# Patient Record
Sex: MALE | Race: WHITE
[De-identification: names, ages, dates, MRNs, and addresses within clinical notes are randomized per-mention and may not be internally consistent; named-entity substitution may affect disease eponyms.]

---

## 2018-02-04 ENCOUNTER — HOSPITAL ENCOUNTER (EMERGENCY)
Dept: HOSPITAL 80 - FED | Age: 48
Discharge: HOME | End: 2018-02-04
Payer: COMMERCIAL

## 2018-02-04 VITALS
DIASTOLIC BLOOD PRESSURE: 84 MMHG | OXYGEN SATURATION: 93 % | SYSTOLIC BLOOD PRESSURE: 125 MMHG | HEART RATE: 77 BPM | RESPIRATION RATE: 14 BRPM

## 2018-02-04 VITALS — TEMPERATURE: 97.7 F
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
## 2018-02-04 DIAGNOSIS — Z87.891: ICD-10-CM

## 2018-02-04 DIAGNOSIS — S43.004A: Primary | ICD-10-CM

## 2018-02-04 PROCEDURE — A4565 SLINGS: HCPCS

## 2018-02-04 PROCEDURE — 0RSJXZZ REPOSITION RIGHT SHOULDER JOINT, EXTERNAL APPROACH: ICD-10-PCS

## 2018-02-04 NOTE — EDPHY
H & P


Stated Complaint: FALL/PROBABLE SHOULDER DISLOCATION





- Personal History


Current Tetanus/Diphtheria Vaccine: Yes





- Medical/Surgical History


Hx Asthma: No


Hx Chronic Respiratory Disease: No


Hx Diabetes: No


Hx Cardiac Disease: No


Hx Renal Disease: No


Hx Cirrhosis: No


Hx Alcoholism: No


Hx HIV/AIDS: No


Hx Splenectomy or Spleen Trauma: No


Other PMH: DENIES





- Social History


Smoking Status: Former smoker


HPI/ROS: 





Chief complaint:  Right shoulder injury





History of present illness:  This is a 47-year-old male who presents to the 

emergency department for a right shoulder injury. Patient reports just prior to 

arrival patient slipped on the ice landing onto his right shoulder.  Since then 

he has had pain in the shoulder and cannot move it.  Reports he has some 

numbness over the right deltoid region. He denies associated signs or symptoms 

including no open wounds.  No report of trauma to the head, neck, chest or 

other parts of the body.





Review of systems:  A 10 point review of systems was obtained and other than 

described above was negative (Dimitri Vasquez)





- Physical Exam


Exam: 





General Appearance: Alert. Nontoxic.


Eyes:  PERRLA


ENT:  No hemotympanum, no benz sign, no raccoon eyes


Respiratory:  Lungs clear to auscultation bilaterally 


Cardiac: Regular rate and rhythm.


Gastrointestinal:  Bowel sounds normal.  Abdomen soft, nondistended, nontender.


Neurological:  Alert and oriented x4. Strength is intact and symmetrical.  

Patient does report decreased sensation over the right deltoid region.  The 

rest the extremities with normal sensation.


Skin:  No open wounds on inspection of the right upper extremity.


Musculoskeletal:  The head is nontender without crepitus or bony deformity.  

The spine is nontender to palpation along its entire length without crepitus, 

bony deformity or step-off.  Chest wall intact to palpation.  Tenderness to the 

right shoulder with obvious defect of the glenohumeral joint.  He cannot move 

the right shoulder because of pain.  Rest of the right upper extremity and 

other extremities are unremarkable. 


 (Dimitri Vasquez)


Constitutional: 





 Initial Vital Signs











Temperature (C)  36.5 C   02/04/18 10:38


 


Heart Rate  107 H  02/04/18 10:38


 


Respiratory Rate  18   02/04/18 10:38


 


Blood Pressure  142/109 H  02/04/18 10:38


 


O2 Sat (%)  100   02/04/18 10:38








 











O2 Delivery Mode               Room Air














Allergies/Adverse Reactions: 


 





Penicillins Allergy (Verified 02/04/18 10:37)


 








Home Medications: 














 Medication  Instructions  Recorded


 


Hydrocodone/APAP 5/325 [Norco 1 tab PO Q6 #10 tab 02/04/18





5/325 (*)]  














Medical Decision Making





- Diagnostics


Imaging: I viewed and interpreted images myself


Procedures: 





Procedure: Dislocation reduction.


The dislocation of the right shoulder was reduced using massage and traction 

technique without complications.  Post reduction the patient's neurovascular 

exam is normal.


Post reduction x-ray demonstrates reduction of the joint to the anatomic 

position. 


The procedure was performed by myself.





Procedure:  Splint placement.





A sling splint was applied.  After application of the splint I returned and re-

examined the patient.  The splint was adequately immobilizing the joint and 

distal to the splint the patient's circulation and sensation was intact. (Dimitri Vasquez)


ED Course/Re-evaluation: 





Patient is seen under the supervision of my secondary supervising physician Dr. Raquel Thompson.  Patient presents to the emergency department for a right 

shoulder injury. There is some decreased sensation on initial evaluation 

although he has good vascular status.  X-ray confirms a right anterior 

dislocation.  This is reduced as per above.  Post treatment patient is feeling 

better with increased sensation in his right arm.  He will be discharged.  He 

will be asked to follow-up with an orthopedic this week for recheck.  Return 

precautions are given.  The patient voiced understanding and agreement with 

plan. (Dimitri Vasquez)





The patient was evaluated and managed by the physician assistant.  I have 

reviewed this chart and I agree with the findings and plan of care as documented

, as indicated by my signature.  I am the secondary supervising physician. (

Raquel Thompson)


Differential Diagnosis: 





Included but not limited to contusion, sprain or strain, labral injury, rotator 

cuff injury, bony fracture, joint dislocation (Dimitri Vasquez)





- Data Points


Medications Given: 





 








Discontinued Medications





Fentanyl (Sublimaze)  100 mcg IVP EDNOW ONE


   Stop: 02/04/18 11:07


   Last Admin: 02/04/18 11:08 Dose:  100 mcg


Fentanyl (Sublimaze)  100 mcg IVP EDNOW ONE


   Stop: 02/04/18 11:54


   Last Admin: 02/04/18 12:00 Dose:  100 mcg


Lorazepam (Ativan Injection)  1 mg IVP EDNOW ONE


   Stop: 02/04/18 11:15


   Last Admin: 02/04/18 11:30 Dose:  1 mg


Ondansetron HCl (Zofran)  4 mg IVP EDNOW ONE


   Stop: 02/04/18 11:07


   Last Admin: 02/04/18 11:08 Dose:  4 mg


Oxycodone/Acetaminophen (Percocet 5/325)  2 tab PO EDNOW ONE


   Stop: 02/04/18 10:49


   Last Admin: 02/04/18 11:40 Dose:  Not Given








Departure





- Departure


Disposition: Home, Routine, Self-Care


Clinical Impression: 


 Shoulder dislocation





Condition: Good


Instructions:  Shoulder Dislocation (ED)


Additional Instructions: 


Follow-up with an orthopedic doctor for continued evaluation and care





In regards to pain control see the following:


 Use ibuprofen [600] mg [3] times a day for the next 2-3 days for pain


 In addition You have been prescribed [Norco] for pain.  [Norco] contains 

Tylenol, do not take extra Tylenol/acetaminophen/Apap/paracetamol with it.  It 

is sedating.





If symptoms worsen or new symptoms develop return to the emergency room for 

recheck


Referrals: 


NONE *PRIMARY CARE P,. [Primary Care Provider] - As per Instructions


Josh Lugo MD [Medical Doctor] - As per Instructions


Prescriptions: 


Hydrocodone/APAP 5/325 [Norco 5/325 (*)] 1 tab PO Q6 #10 tab